# Patient Record
Sex: FEMALE | Race: BLACK OR AFRICAN AMERICAN
[De-identification: names, ages, dates, MRNs, and addresses within clinical notes are randomized per-mention and may not be internally consistent; named-entity substitution may affect disease eponyms.]

---

## 2018-03-09 ENCOUNTER — HOSPITAL ENCOUNTER (OUTPATIENT)
Dept: HOSPITAL 62 - WI | Age: 73
End: 2018-03-09
Attending: INTERNAL MEDICINE
Payer: MEDICARE

## 2018-03-09 DIAGNOSIS — Z12.31: Primary | ICD-10-CM

## 2018-03-09 DIAGNOSIS — R10.30: ICD-10-CM

## 2018-03-09 DIAGNOSIS — D25.9: ICD-10-CM

## 2018-03-09 PROCEDURE — 76700 US EXAM ABDOM COMPLETE: CPT

## 2018-03-09 PROCEDURE — 77067 SCR MAMMO BI INCL CAD: CPT

## 2018-03-09 PROCEDURE — 76856 US EXAM PELVIC COMPLETE: CPT

## 2018-03-09 NOTE — WOMENS IMAGING REPORT
EXAM DESCRIPTION:  BILAT SCREENING MAMMO W/CAD



COMPLETED DATE/TIME:  3/9/2018 10:12 am



REASON FOR STUDY:  SCREENING MAMMO R10.30  LOWER ABDOMINAL PAIN, UNSPECIFIED Z12.31  ENCNTR SCREEN MA
MMOGRAM FOR MALIGNANT NEOPLASM OF ANABEL



COMPARISON:  None.



TECHNIQUE:  Standard craniocaudal and mediolateral oblique views of each breast recorded using digita
l acquisition.



LIMITATIONS:  None.



FINDINGS:  No masses, calcifications or architectural distortion. No areas of suspicion.

Read with the assistance of CAD.

[facility]2 Cenova Version 1.3

.[facility] - R2 Cenova Version 1.3

.\[facility]\"" [facility] - R2 Cenova Version 2.4

[place] [place] Cenova Version 2.4

.[facility] - R2  Version 9.2



IMPRESSION:  NORMAL MAMMOGRAM.  BIRADS 1.



BREAST DENSITY:  b. There are scattered areas of fibroglandular density.



BIRAD:  1 NEGATIVE



RECOMMENDATION:  ROUTINE SCREENING



COMMENT:  The patient has been notified of the results by letter per SA requirements. Additional no
tification policies are in place for contacting patient with suspicious or incomplete findings.

Quality ID #225: The American College of Radiology recommends an annual screening mammogram for women
 aged 40 years or over. This facility utilizes a reminder system to ensure that all patients receive 
reminder letters, and/or direct phone calls for appointments. This includes reminders for routine scr
eening mammograms, diagnostic mammograms, or other Breast Imaging Interventions when appropriate.  Th
is patient will be placed in the appropriate reminder system.

The American College of Radiology (ACR) has developed recommendations for screening MRI of the breast
s in certain patient populations, to be used in conjunction with mammography.  Breast MRI surveillanc
e may be appropriate for women with more than 20% lifetime risk of developing breast cancer  as deter
mined by genetic testing, significant family history of the disease, or history of mantle radiation f
or Hodgkins Disease.  ACR Practice Guidelines 2008.



TECHNICAL DOCUMENTATION:  FINDING NUMBER: (1)

ASSESSMENT: (1)

JOB ID:  [id number]

 2011 [employer]- All Rights Reserved



Reading location - IP/workstation name: [facility]

## 2018-03-09 NOTE — WOMENS IMAGING REPORT
EXAM DESCRIPTION:  U/S PELVIS NON-OB



COMPLETED DATE/TIME:  3/9/2018 10:24 am



REASON FOR STUDY:  ABDOMINAL PAIN R10.30  LOWER ABDOMINAL PAIN, UNSPECIFIED Z12.31  ENCNTR SCREEN DINO
MOGRAM FOR MALIGNANT NEOPLASM OF ANABEL



COMPARISON:  None.



TECHNIQUE:  Dynamic and static grayscale images acquired of the pelvis via transabdominal approach an
d recorded on PACS. Additional selected color Doppler and spectral images recorded.



LIMITATIONS:  None.



FINDINGS:  UTERUS: Enlarged.  Multiple fibroids.  The largest measures 7.3 x 5.5 x 5.1 cm.

ENDOMETRIAL STRIPE: Not well seen because of fibroids.

CERVIX: Not well seen.

RIGHT OVARY: Ovary not seen.

RIGHT OVARY DOPPLER: Normal arterial vascular flow without evidence for torsion.

LEFT OVARY: Ovary not seen.

LEFT OVARY DOPPLER: Normal arterial vascular flow without evidence for torsion.

FREE FLUID: None noted.

OTHER: No other significant finding.

MEASUREMENTS:

UTERUS: 10.4 x 5.8 x 10.8 cm

ENDOMETRIAL STRIPE: Not seen

RIGHT OVARY: Not seen

LEFT OVARY: Not seen



IMPRESSION:  Enlarged uterus with fibroids.



TECHNICAL DOCUMENTATION:  JOB ID:  [id number]

 2011 [employer]- All Rights Reserved



Reading location - IP/workstation name: [email or web address]

## 2018-03-09 NOTE — WOMENS IMAGING REPORT
EXAM DESCRIPTION:  U/S ABDOMEN TOTAL



COMPLETED DATE/TIME:  3/9/2018 10:24 am



REASON FOR STUDY:  ABDOMINAL PAIN R10.30  LOWER ABDOMINAL PAIN, UNSPECIFIED Z12.31  ENCNTR SCREEN DINO
MOGRAM FOR MALIGNANT NEOPLASM OF ANABEL



COMPARISON:  None.



TECHNIQUE:  Dynamic and static grayscale images acquired of the abdomen and recorded on PACS. Additio
nal selected color Doppler and spectral images recorded.



LIMITATIONS:  None.



FINDINGS:  PANCREAS: No masses. Visualized pancreatic duct normal caliber.

LIVER: 17.3 cm.  There is a solid slightly hyperechoic mass measuring 7.1 x 6.3 x 5.9 cm in the left 
lobe.

LIVER VASCULATURE: Normal directional flow of the main portal vein and hepatic veins.

GALLBLADDER: There is a small gallbladder polyp.  No stones are present.  The wall thickness is katrin
l.

ULTRASOUND-DETECTED VELIZ'S SIGN: Negative.

INTRAHEPATIC DUCTS AND COMMON DUCT: CBD and intrahepatic ducts normal caliber. No filling defects.

INFERIOR VENA CAVA: Not well seen

AORTA: Not well seen

RIGHT KIDNEY:  Normal size, 11.3 x 4.2 x 5.7 cm.   Normal echogenicity.   No solid or suspicious mass
es.   No hydronephrosis.   No calcifications.

LEFT KIDNEY:  Normal size, 10 x 4.6 x 4.9 cm.   Normal echogenicity.   No solid or suspicious masses.
  There is a 26 mm cyst.   No hydronephrosis.   No calcifications.

SPLEEN: Normal size, 8.5 cm.  No masses.

PERITONEAL AND PLEURAL SPACES: No ascites or effusions.

OTHER: No other significant finding.



IMPRESSION:  There is a 7.1 x 6.3 x 5.9 cm hepatic mass.  CT without and with contrast recommended.



TECHNICAL DOCUMENTATION:  JOB ID:  [id number]

 2011 [employer]- All Rights Reserved



Reading location - IP/workstation name: [email or web address]

## 2018-03-14 ENCOUNTER — HOSPITAL ENCOUNTER (OUTPATIENT)
Dept: HOSPITAL 62 - RAD | Age: 73
End: 2018-03-14
Attending: INTERNAL MEDICINE
Payer: MEDICARE

## 2018-03-14 DIAGNOSIS — R93.2: Primary | ICD-10-CM

## 2018-03-14 DIAGNOSIS — R16.0: ICD-10-CM

## 2018-03-14 PROCEDURE — 74160 CT ABDOMEN W/CONTRAST: CPT

## 2018-03-14 NOTE — RADIOLOGY REPORT (SQ)
EXAM DESCRIPTION:  CT ABDOMEN WITH IV   ORAL CONT



COMPLETED DATE/TIME:  3/14/2018 12:55 pm



REASON FOR STUDY:  ABNORMAL FINDINGS ON DX IMAGING OF LIVER AND BILIARY TRACT (R93.2) R93.2  ABNORMAL
 FINDINGS ON DX IMAGING OF LIVER AND BILIARY T



COMPARISON:  Abdominal ultrasound dated 3/9/2018



TECHNIQUE:  CT scan of the abdomen performed with intravenous and with oral contrast using helical sc
anning technique with dynamic intravenous contrast injection. Images reviewed with lung, soft tissue,
 and bone windows. Reconstructed coronal and sagittal MPR images reviewed. Delayed images for evaluat
ion of the urinary system also acquired and evaluated.

All images stored on PACS. All CT scanners at this facility use dose modulation, iterative reconstruc
tion, and/or weight based dosing when appropriate to reduce radiation dose to as low as reasonably ac
hievable (ALARA).

[facility]: Dose Right  [facility]: CareDose  [facility]: Dose Right    [facility]: Teradose 4D    [facility]: Smart Technologies



CONTRAST TYPE AND DOSE:  contrast/concentration: Isovue 370.00 mg/ml; Total Contrast Delivered: 81.0 
ml; Total Saline Delivered: 68.0 ml



RENAL FUNCTION:  Creatinine 0.83



RADIATION DOSE:  CT Rad equipment meets quality standard of care and radiation dose reduction techniq
ues were employed. CTDIvol: 6.0 - 6.8 mGy. DLP: 435 mGy-cm. .



LIMITATIONS:  None.



FINDINGS:  LOWER CHEST: No significant findings. No nodules or infiltrates.

LIVER: There is an irregular heterogeneously relative low density mass in the left lobe of the liver 
measuring 5.6 x 4.8 cm in diameters which correlates with abdominal ultrasound findings.  There is a 
small 2nd relative low density mass in the right lobe of the liver or measuring 10 mm in diameters.  
Differential possibilities would include a primary hepatic neoplasm versus metastatic disease.  No ot
her hepatic mass lesions are identified.

SPLEEN: Normal size. No focal lesions.

PANCREAS: No masses. No significant calcifications. No adjacent inflammation or peripancreatic fluid 
collections. Pancreatic duct not dilated.

GALLBLADDER: No identified stones by CT criteria. No inflammatory changes to suggest cholecystitis.

ADRENAL GLANDS: No significant masses or asymmetry.

RIGHT KIDNEY AND URETER: No solid masses.   No significant calcifications.   No hydronephrosis or hyd
roureter.

LEFT KIDNEY AND URETER: No solid masses.  2.4 cm in diameter left renal cyst is identified.  No signi
ficant calcifications.   No hydronephrosis or hydroureter.

AORTA AND VESSELS: No aneurysm. No dissection. Renal arteries, SMA, celiac without stenosis.

RETROPERITONEUM: There is extensive periaortic adenopathy especially in the upper abdomen measuring 3
.5 x 2.8 cm in diameters on the left and 3.1 x 2.6 cm in diameters on the right.  There is anterior d
isplacement of the inferior vena cava.

BOWEL AND PERITONEAL CAVITY: There is a possible mass in the ascending colon just distal to the level
 of the ileo cecal valve.  Colonoscopy may be of value for further evaluation

APPENDIX: Not visualized

ABDOMINAL WALL: No masses. No hernias.

BONES: No significant or acute findings.

OTHER: No other significant finding.



IMPRESSION:  Mass lesions in the liver as noted above most consistent with metastatic disease althoug
h the possibility of primary hepatic neoplasms cannot be excluded.  Periaortic adenopathy as noted ab
ove.  There is a possible mass lesion in the ascending colon just distal to the level of the ileoceca
l valve.  Colonoscopy may be of value for further evaluation.  Other findings as noted above



TECHNICAL DOCUMENTATION:  JOB ID:  [id number]

Quality ID # 436: Final reports with documentation of one or more dose reduction techniques (e.g., Au
tomated exposure control, adjustment of the mA and/or kV according to patient size, use of iterative 
reconstruction technique)

 2011 [employer]- All Rights Reserved



Reading location - IP/workstation name: [email or web address]

## 2018-04-08 ENCOUNTER — HOSPITAL ENCOUNTER (OUTPATIENT)
Dept: HOSPITAL 62 - RAD | Age: 73
End: 2018-04-08
Attending: INTERNAL MEDICINE
Payer: MEDICARE

## 2018-04-08 DIAGNOSIS — C18.9: Primary | ICD-10-CM

## 2018-04-08 PROCEDURE — 78815 PET IMAGE W/CT SKULL-THIGH: CPT

## 2018-04-08 PROCEDURE — A9552 F18 FDG: HCPCS

## 2018-04-09 NOTE — RADIOLOGY REPORT (SQ)
EXAM DESCRIPTION:  PET CT SKULL/THIGH



COMPLETED DATE/TIME:  4/8/2018 9:30 pm



REASON FOR STUDY:  COLON CANCER C18.9  MALIGNANT NEOPLASM OF COLON, UNSPECIFIED



COMPARISON:  CT abdomen pelvis 3/14/2018



RADIONUCLIDE AND DOSE:  9.8 mCi F18 FDG

The route of agent administration: Intravenous



FASTING BLOOD SUGAR:  77 mg/dl



CONTRAST TYPE AND DOSE:  No CT contrast given.



TECHNIQUE:  Blood glucose level was verified.  Above dose of FDG was injected intravenously.  2-D seg
mented attenuation correction images were obtained from the base of the skull to the midthighs.  Nonc
ontrast CT images were obtained for attenuation correction and fusion with emission images.  CT image
s were performed without oral or intravenous contrast and are not sensitive for parenchymal lesions. 
 A series of overlapping emission PET images were obtained.  Images reviewed and manipulated at [facility] work station by the radiologist.  Images stored on PACS.



LIMITATIONS:  None.



FINDINGS:  HEAD AND NECK:  A 1.4 x 0.8 cm left supraclavicular lymph node is present on axial image 6
0, with SUV of 3.4.

CHEST: A less than 1 cm left hilar lymph node is present on axial image 87 with SUV of 5.2.

There are multiple tiny 5 to 6 mm lung parenchymal nodules worrisome for metastatic disease.  These a
re too small to characterize effectively with PET-CT.

ABDOMEN AND PELVIS: An ascending colon mass is present with SUV of 19.

There are multiple para-aortic and aortocaval lymph nodes in the retroperitoneum ranging from SUV 5.7
 to SUV of 8.

The dominant liver metastatic lesion is present 5.6 x 4.8 cm in size in the left lobe, with SUV of 11
.3.  There is a 1 cm right lobe liver metastatic lesion is present with SUV of 7.5.

PROXIMAL LOWER EXTREMITIES: No areas of abnormal metabolic activity in the soft tissues of the lower 
extremities.

BONES: No abnormal metabolic activity in the visualized skeleton.

ADDITIONAL CT FINDINGS: Fibroid uterus.  Left upper pole 2.4 cm renal cortical cyst.

OTHER: Liver background activity 2.3 SUV.  Blood pool background activity SUV 2.0



IMPRESSION:  Malignant ascending colon mass with malignant para-aortic adenopathy, liver metastatic l
esions, and hypermetabolic lymph nodes in the chest along the left supraclavicular and left hilar reg
ions.



TECHNICAL DOCUMENTATION:  JOB ID:  [id number]

 2011 [email or web address]- All Rights Reserved



Reading location - IP/workstation name: [email or web address]

## 2018-04-17 ENCOUNTER — HOSPITAL ENCOUNTER (OUTPATIENT)
Dept: HOSPITAL 62 - OD | Age: 73
End: 2018-04-17
Attending: INTERNAL MEDICINE
Payer: MEDICARE

## 2018-04-17 DIAGNOSIS — M25.551: Primary | ICD-10-CM

## 2018-04-17 NOTE — RADIOLOGY REPORT (SQ)
EXAM DESCRIPTION:  HIP RIGHT AP/LATERAL



COMPLETED DATE/TIME:  4/17/2018 12:21 pm



REASON FOR STUDY:  PAIN IN RIGHT HIP M25.551  PAIN IN RIGHT HIP



COMPARISON:  4/6/2016



NUMBER OF VIEWS:  Two views.



TECHNIQUE:  AP pelvis and additional frog-leg view of the right hip.



LIMITATIONS:  None.



FINDINGS:  MINERALIZATION: Normal.

RIGHT HIP: No fracture or dislocation.  No worrisome bone lesions. No contour deformity.  No joint sp
ace narrowing.

LEFT HIP: No fracture or dislocation.  No worrisome bone lesions.

PUBIS AND ISCHIUM: No fracture.

PELVIS: No fracture.

SACRUM: No fracture or dislocation. No worrisome bone lesions.

LOWER LUMBAR SPINE: No fracture or dislocation. No worrisome bone lesions.  No significant disc disea
se.

SOFT TISSUES: Extensive calcifications in the pelvis presumably fibroids.  Stable appearance.

OTHER: No other significant finding.



IMPRESSION:  NEGATIVE STUDY OF THE RIGHT HIP. NO EXPLANATION FOR PAIN.



TECHNICAL DOCUMENTATION:  JOB ID:  [id number]

 2011 [email or web address]- All Rights Reserved



Reading location - IP/workstation name: [email or web address]